# Patient Record
Sex: MALE | Race: WHITE
[De-identification: names, ages, dates, MRNs, and addresses within clinical notes are randomized per-mention and may not be internally consistent; named-entity substitution may affect disease eponyms.]

---

## 2021-12-08 ENCOUNTER — HOSPITAL ENCOUNTER (EMERGENCY)
Dept: HOSPITAL 47 - EC | Age: 56
Discharge: HOME | End: 2021-12-08
Payer: COMMERCIAL

## 2021-12-08 VITALS — DIASTOLIC BLOOD PRESSURE: 84 MMHG | RESPIRATION RATE: 18 BRPM | SYSTOLIC BLOOD PRESSURE: 137 MMHG | HEART RATE: 90 BPM

## 2021-12-08 VITALS — TEMPERATURE: 98.6 F

## 2021-12-08 DIAGNOSIS — F17.200: ICD-10-CM

## 2021-12-08 DIAGNOSIS — G51.0: Primary | ICD-10-CM

## 2021-12-08 PROCEDURE — 99284 EMERGENCY DEPT VISIT MOD MDM: CPT

## 2021-12-08 NOTE — ED
Neuro HPI





- General


Chief Complaint: Neuro Symptoms/Deficit


Stated Complaint: Droopy Eye


Source: patient, RN notes reviewed, old records reviewed


Mode of arrival: ambulatory


Limitations: no limitations





- History of Present Illness


Is the patient presenting with stroke symptoms?: No


-: hour(s)


Initial Comments: 





This is a 56-year-old male DF for evaluation with no medical history patient 

presents with left-sided facial droop some drooling unable to close his left 

eye.  Patient has no history of CVA.  No history of high blood pressure high 

cholesterol diabetes or any other complaint.  Patient has no recent travel 

history or sick contacts no trauma no fevers.  Symptoms began when he awoke this

morning he noticed when he was driving to work.  Patient presents tonight is his

wife is concerned of his symptoms


Location: left face


History of same: No


Place: home


Severity: mild


Quality: numb, tingling


Improves With: none


Worsens With: none


On Anticoagulants: No


Context: sudden onset


Associated Symptoms: denies other symptoms


Treatments Prior to Arrival: none





- Related Data


Home Medications: 


                                  Previous Rx's











 Medication  Instructions  Recorded


 


Famotidine [Pepcid] 20 mg PO DAILY #5 tablet 09/01/14


 


diphenhydrAMINE [Benadryl] 50 mg PO HS PRN #5 capsule 09/01/14


 


predniSONE 50 mg PO DAILY #4 tab 09/01/14


 


Nystatin 100,000 Unit/gm Powd 1 applic TOPICAL BID #15 gram 12/08/21





[Mycostatin Powder]  


 


predniSONE [Deltasone] 20 mg PO AS DIRECTED #40 tab 12/08/21


 


valACYclovir HCL [Valtrex] 1,000 mg PO BID #14 tab 12/08/21











Allergies/Adverse Reactions: 


                                    Allergies











Allergy/AdvReac Type Severity Reaction Status Date / Time


 


No Known Allergies Allergy   Verified 12/08/21 20:56














Review of Systems


ROS Statement: 


Those systems with pertinent positive or pertinent negative responses have been 

documented in the HPI.





ROS Other: All systems not noted in ROS Statement are negative.





General Exam





- General Exam Comments


Initial Comments: 





NIH of 1


GCS of 15


Neurological deficits are limited to facial nerve distribution,


Limitations: no limitations


General appearance: alert, in no apparent distress


Head exam: Present: atraumatic, normocephalic, normal inspection


Eye exam: Present: normal appearance, PERRL, EOMI.  Absent: scleral icterus, 

conjunctival injection, periorbital swelling


ENT exam: Present: normal exam, mucous membranes moist


Neck exam: Present: normal inspection.  Absent: tenderness, meningismus, 

lymphadenopathy


Respiratory exam: Present: normal lung sounds bilaterally.  Absent: respiratory 

distress, wheezes, rales, rhonchi, stridor


Cardiovascular Exam: Present: regular rate, normal rhythm, normal heart sounds. 

 Absent: systolic murmur, diastolic murmur, rubs, gallop, clicks


GI/Abdominal exam: Present: soft, normal bowel sounds.  Absent: distended, 

tenderness, guarding, rebound, rigid


Extremities exam: Present: normal inspection, full ROM, normal capillary refill.

  Absent: tenderness, pedal edema, joint swelling, calf tenderness


Back exam: Present: normal inspection


Neurological exam: Present: alert, oriented X3, CN II-XII intact


Psychiatric exam: Present: normal affect, normal mood


Skin exam: Present: warm, dry, intact, normal color.  Absent: rash





Stroke MDM





- NIH Stroke Scale


1a. Level of Consciousness: (0) alert


1b. LOC Questions: (0) answers correctly


1c. LOC Commands: (0) performs tasks correctly


2. Best Gaze: (0) normal


3. Visual: (0) no visual loss


4. Facial Palsy: (1) minor paralysis


5a. Motor Arm Left: (0) no drift


5b. Motor Arm Right: (0) no drift


6a. Motor Leg Left: (0) no drift


6b. Motor Leg Right: (0) no drift


7. Limb Ataxia: (0) absent


8. Sensory: (0) normal


9. Best Language: (0) no aphasia


10. Dysarthria: (0) normal


11. Extinction/Inattention: (0) no abnormality





- Thrombolytic Inclusion/Exclusion


Thrombolytic Exclusion Criteria: Symptom Onset > 4.5 Hours





- Medical Decision Making





56 male with signs and symptoms typical of Bell's palsy, in ability to close 

left eye inability to raise left eyebrow.  Left-sided forehead paralysis.  Left-

sided facial droop.  Patient will be placed on prednisone, Valtrex and 

discharged





Past Medical History


Past Medical History: No Reported History


History of Any Multi-Drug Resistant Organisms: None Reported


Past Surgical History: No Surgical Hx Reported


Past Psychological History: No Psychological Hx Reported


Smoking Status: Current every day smoker


Past Alcohol Use History: Occasional


Past Drug Use History: None Reported





Course


                                   Vital Signs











  12/08/21





  20:57


 


Temperature 98.6 F


 


Pulse Rate 96


 


Respiratory 20





Rate 


 


Blood Pressure 141/81


 


O2 Sat by Pulse 94 L





Oximetry 














- Reevaluation(s)


Reevaluation #1: 





12/08/21 22:04


Records reviewed


Reevaluation #2: 





12/08/21 22:04


No change in symptoms here in the emergency department


Reevaluation #3: 





12/08/21 22:04


Patient informed results and questions have been answered





Disposition


Clinical Impression: 


 Bell's palsy





Disposition: HOME SELF-CARE


Condition: Good


Instructions (If sedation given, give patient instructions):  [place] Palsy (ED)


Prescriptions: 


predniSONE [Deltasone] 20 mg PO AS DIRECTED #40 tab


Nystatin 100,000 Unit/gm Powd [Mycostatin Powder] 1 applic TOPICAL BID #15 gram


valACYclovir HCL [Valtrex] 1,000 mg PO BID #14 tab


Is patient prescribed a controlled substance at d/c from ED?: No


Referrals: 


Ehsan Collado MD [Primary Care Provider] - 1-2 days